# Patient Record
Sex: MALE | Race: BLACK OR AFRICAN AMERICAN | NOT HISPANIC OR LATINO | Employment: STUDENT | URBAN - METROPOLITAN AREA
[De-identification: names, ages, dates, MRNs, and addresses within clinical notes are randomized per-mention and may not be internally consistent; named-entity substitution may affect disease eponyms.]

---
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
## 2023-06-26 ENCOUNTER — TELEPHONE (OUTPATIENT)
Dept: PRIMARY CARE CLINIC | Facility: CLINIC | Age: 18
End: 2023-06-26
Payer: MEDICAID

## 2023-06-26 NOTE — TELEPHONE ENCOUNTER
----- Message from [facility] Jose sent at 6/23/2023 10:18 AM CDT -----  Contact: Ms. Brianne Pendleton Phone# [phone], fax# [phone]  Ms. Decker at [facility] said that she faxed over a Medical Clearance form to you on May twenty six and June ninth and she would like to know if you have received it?

## 2023-06-26 NOTE — TELEPHONE ENCOUNTER
Attempted to contact MsEkaterina Brianne with [employer] at [phone] to inform her that we have not received a medical clearance form for Blas Nicole and he has not been seen in over 1 year and will need an appointment before this paper work can be filled out. I left a message for Ms. Brianne to return my call.

## 2023-07-21 ENCOUNTER — PATIENT MESSAGE (OUTPATIENT)
Dept: PRIMARY CARE CLINIC | Facility: CLINIC | Age: 18
End: 2023-07-21
Payer: MEDICAID

## 2024-06-19 ENCOUNTER — OFFICE VISIT (OUTPATIENT)
Dept: PRIMARY CARE CLINIC | Facility: CLINIC | Age: 19
End: 2024-06-19
Payer: MEDICAID

## 2024-06-19 VITALS
WEIGHT: 315 LBS | BODY MASS INDEX: 46.65 KG/M2 | RESPIRATION RATE: 18 BRPM | HEIGHT: 69 IN | DIASTOLIC BLOOD PRESSURE: 80 MMHG | SYSTOLIC BLOOD PRESSURE: 128 MMHG | OXYGEN SATURATION: 98 % | HEART RATE: 80 BPM

## 2024-06-19 DIAGNOSIS — Z00.00 GENERAL MEDICAL EXAMINATION: ICD-10-CM

## 2024-06-19 DIAGNOSIS — L21.9 SEBORRHEIC DERMATITIS: Primary | ICD-10-CM

## 2024-06-19 DIAGNOSIS — L73.8 FOLLICULITIS BARBAE: ICD-10-CM

## 2024-06-19 DIAGNOSIS — E66.01 OBESITY, MORBID, BMI 40.0-49.9: ICD-10-CM

## 2024-06-19 PROCEDURE — 1159F MED LIST DOCD IN RCRD: CPT | Mod: CPTII,,,

## 2024-06-19 PROCEDURE — 99215 OFFICE O/P EST HI 40 MIN: CPT | Mod: PBBFAC,PN

## 2024-06-19 PROCEDURE — 99215 OFFICE O/P EST HI 40 MIN: CPT | Mod: S$PBB,,,

## 2024-06-19 PROCEDURE — 99999 PR PBB SHADOW E&M-EST. PATIENT-LVL V: CPT | Mod: PBBFAC,,,

## 2024-06-19 PROCEDURE — 3074F SYST BP LT 130 MM HG: CPT | Mod: CPTII,,,

## 2024-06-19 PROCEDURE — 3079F DIAST BP 80-89 MM HG: CPT | Mod: CPTII,,,

## 2024-06-19 PROCEDURE — 3008F BODY MASS INDEX DOCD: CPT | Mod: CPTII,,,

## 2024-06-19 PROCEDURE — 1160F RVW MEDS BY RX/DR IN RCRD: CPT | Mod: CPTII,,,

## 2024-06-19 RX ORDER — MUPIROCIN 20 MG/G
OINTMENT TOPICAL 3 TIMES DAILY
Qty: 15 G | Refills: 0 | Status: SHIPPED | OUTPATIENT
Start: 2024-06-19 | End: 2024-06-26

## 2024-06-19 RX ORDER — KETOCONAZOLE 20 MG/ML
SHAMPOO, SUSPENSION TOPICAL
Qty: 120 ML | Refills: 1 | Status: SHIPPED | OUTPATIENT
Start: 2024-06-20

## 2024-06-19 NOTE — PROGRESS NOTES
"Subjective     Patient ID: Blas Nicole is a 19 y.o. male.    Chief Complaint: Weight Loss, Referral, and Establish Care    Blas Nicole is a 19 year old male, presents to clinic for general medical exam, establishment of care, weight management and referral for "scalp issues".  New to me. No PCP . Medical and surgical history, in addition to problem list reviewed and listed below.     Prediabetes - Last hemoglobin A1C on 4/1/2024: 5.8 (per patient's external portal, My Chart: also Hepatitis C, HIV 1 and 2, lipid panel and CMP done).   Interested in weight loss; requesting referral. Started going to gym for fitness. Leaning towards Mediterranean foods.    Reports scalp with flakiness; itches.  States use Sybil products and ViaViewdoscope Da Brat [place] Braid  hair gel for grooming platted hair.    Hair/Scalp Problem  This is a recurrent (Been getting for about 2-3 years) problem. The current episode started 1 to 4 weeks ago. The problem occurs daily. The problem has been gradually worsening. Associated symptoms include headaches. Pertinent negatives include no anorexia, arthralgias, change in bowel habit, chest pain, chills, congestion, coughing, diaphoresis, fatigue, fever, joint swelling, myalgias, nausea, neck pain, numbness, sore throat, swollen glands, urinary symptoms, vertigo, visual change, vomiting or weakness. Treatments tried: Neutrogena shampoo for two weeks. The treatment provided mild relief.     Review of Systems   Constitutional:  Negative for chills, diaphoresis, fatigue and fever.   HENT:  Negative for nasal congestion, ear pain and sore throat. Trouble swallowing: mupir.   Eyes:  Negative for pain.   Respiratory:  Negative for cough, chest tightness and shortness of breath.    Cardiovascular:  Negative for chest pain and palpitations.   Gastrointestinal:  Negative for anorexia, change in bowel habit, nausea and vomiting.   Genitourinary:  Negative for difficulty urinating.   Musculoskeletal:  " "Negative for arthralgias, joint swelling, myalgias and neck pain.   Allergic/Immunologic: Negative for food allergies and frequent infections.   Neurological:  Positive for headaches. Negative for vertigo, weakness and numbness.   Psychiatric/Behavioral:  Positive for sleep disturbance. Negative for dysphoric mood, self-injury and suicidal ideas.      Past Medical History:   Diagnosis Date    Prediabetes      History reviewed. No pertinent surgical history.  Social History     Socioeconomic History    Marital status: Single   Tobacco Use    Smoking status: Never    Smokeless tobacco: Never     Review of patient's allergies indicates:  No Known Allergies  Patient Active Problem List   Diagnosis    Tinea pedis    Folliculitis        Objective     Vitals:    06/19/24 1509   BP: 128/80   Pulse: 80   Resp: 18   SpO2: 98%   Weight: (!) 144.7 kg (319 lb 1.9 oz)   Height: 5' 9" (1.753 m)       Physical Exam  Constitutional:       General: He is not in acute distress.     Appearance: Normal appearance. He is obese. He is not ill-appearing.   HENT:      Head: Normocephalic and atraumatic.      Right Ear: Tympanic membrane, ear canal and external ear normal.      Left Ear: Tympanic membrane, ear canal and external ear normal.      Nose: Nose normal.      Mouth/Throat:      Mouth: Mucous membranes are moist.      Pharynx: Oropharynx is clear.   Eyes:      Extraocular Movements: Extraocular movements intact.      Conjunctiva/sclera: Conjunctivae normal.      Pupils: Pupils are equal, round, and reactive to light.      Comments: Eyeglasses on.   Cardiovascular:      Rate and Rhythm: Normal rate and regular rhythm.      Pulses: Normal pulses.   Pulmonary:      Effort: Pulmonary effort is normal. No respiratory distress.      Breath sounds: Normal breath sounds.   Abdominal:      General: Bowel sounds are normal.      Palpations: Abdomen is soft.   Musculoskeletal:         General: Normal range of motion.      Cervical back: Normal " range of motion and neck supple.   Skin:     General: Skin is warm and dry.      Capillary Refill: Capillary refill takes less than 2 seconds.      Comments: Dispersed small, bumps to chin/beard; few pustular with mild erythema to left chin area.   Neurological:      Mental Status: He is alert and oriented to person, place, and time.   Psychiatric:         Mood and Affect: Mood normal.         Behavior: Behavior normal.          Assessment and Plan     1. Seborrheic dermatitis  -     ketoconazole (NIZORAL) 2 % shampoo; Apply topically twice a week. Shampoo hair twice a week for three weeks.  Dispense: 120 mL; Refill: 1        -     Consider avoiding foods that can promote yeast growth such as cheese, bread, wine and beer; taking a probiotic, omega-3 supplement, multivitamin with biotin; and limit intake of refined carbohydrates.     2. Folliculitis barbae  -     mupirocin (BACTROBAN) 2 % ointment; Apply topically 3 (three) times daily. for 7 days  Dispense: 15 g; Refill: 0         -    Recommend not shaving everyday avoid while medical regimen in place). Properly cleanse electric razor.    3. General medical examination    4. Obesity, morbid, BMI 40.0-49.9  -     Ambulatory referral/consult to Weight Management Program; Future; Expected date: 06/26/2024  -    Eat at regular intervals, a balanced meal, that include lean protein (as in  turkey, chicken, fish), whole grains, fruit and vegetables without added sugar. Avoid fried and fast foods. Limit sodium (salt) intake. Stay hydrated; avoid sodas and sugary drinks.  Exercise regimen, as in walking, 3 - 4 times per week, for at least 30- 45 minutes; increase as tolerated.       Follow up in about 2 months (around 8/19/2024).    I spent a total of 45 minutes on the day of the visit.This includes face to face time and non-face to face time preparing to see the patient (eg, review of tests), obtaining and/or reviewing separately obtained history, documenting clinical  information in the electronic or other health record, independently interpreting results and communicating results to the patient/family/caregiver, or care coordinator.     Lori A. Stephens Sandifer, REECE-C

## 2024-07-15 ENCOUNTER — PATIENT MESSAGE (OUTPATIENT)
Dept: PRIMARY CARE CLINIC | Facility: CLINIC | Age: 19
End: 2024-07-15
Payer: MEDICAID

## 2024-07-16 ENCOUNTER — TELEPHONE (OUTPATIENT)
Dept: PRIMARY CARE CLINIC | Facility: CLINIC | Age: 19
End: 2024-07-16
Payer: MEDICAID

## 2024-07-16 NOTE — TELEPHONE ENCOUNTER
Spoke with patient regarding Referral/consult to Weight Management Program. Patient questioned being told is with General Surgery. Explained to patient order is in correctly but with that specialty.  Informed I need to just modify order to indicate location.  Patient was given the opportunity to ask questions.  Patient voiced understanding and plan.  Order modified to indicate location ([facility] and [place]).

## 2024-08-20 ENCOUNTER — OFFICE VISIT (OUTPATIENT)
Dept: PRIMARY CARE CLINIC | Facility: CLINIC | Age: 19
End: 2024-08-20
Payer: MEDICAID

## 2024-08-20 VITALS
BODY MASS INDEX: 44.1 KG/M2 | DIASTOLIC BLOOD PRESSURE: 79 MMHG | WEIGHT: 315 LBS | OXYGEN SATURATION: 99 % | TEMPERATURE: 98 F | HEART RATE: 78 BPM | HEIGHT: 71 IN | SYSTOLIC BLOOD PRESSURE: 121 MMHG

## 2024-08-20 DIAGNOSIS — L21.9 SEBORRHEIC DERMATITIS: ICD-10-CM

## 2024-08-20 DIAGNOSIS — Z00.00 ROUTINE HEALTH MAINTENANCE: Primary | ICD-10-CM

## 2024-08-20 DIAGNOSIS — E66.01 OBESITY, MORBID, BMI 40.0-49.9: ICD-10-CM

## 2024-08-20 DIAGNOSIS — Z13.1 DIABETES MELLITUS SCREENING: ICD-10-CM

## 2024-08-20 DIAGNOSIS — B35.0 TINEA CAPITIS: ICD-10-CM

## 2024-08-20 PROCEDURE — 99999 PR PBB SHADOW E&M-EST. PATIENT-LVL III: CPT | Mod: PBBFAC,,, | Performed by: STUDENT IN AN ORGANIZED HEALTH CARE EDUCATION/TRAINING PROGRAM

## 2024-08-20 PROCEDURE — 3074F SYST BP LT 130 MM HG: CPT | Mod: CPTII,,, | Performed by: STUDENT IN AN ORGANIZED HEALTH CARE EDUCATION/TRAINING PROGRAM

## 2024-08-20 PROCEDURE — 1159F MED LIST DOCD IN RCRD: CPT | Mod: CPTII,,, | Performed by: STUDENT IN AN ORGANIZED HEALTH CARE EDUCATION/TRAINING PROGRAM

## 2024-08-20 PROCEDURE — 3008F BODY MASS INDEX DOCD: CPT | Mod: CPTII,,, | Performed by: STUDENT IN AN ORGANIZED HEALTH CARE EDUCATION/TRAINING PROGRAM

## 2024-08-20 PROCEDURE — 99214 OFFICE O/P EST MOD 30 MIN: CPT | Mod: S$PBB,,, | Performed by: STUDENT IN AN ORGANIZED HEALTH CARE EDUCATION/TRAINING PROGRAM

## 2024-08-20 PROCEDURE — 99213 OFFICE O/P EST LOW 20 MIN: CPT | Mod: PBBFAC,PN | Performed by: STUDENT IN AN ORGANIZED HEALTH CARE EDUCATION/TRAINING PROGRAM

## 2024-08-20 PROCEDURE — 3078F DIAST BP <80 MM HG: CPT | Mod: CPTII,,, | Performed by: STUDENT IN AN ORGANIZED HEALTH CARE EDUCATION/TRAINING PROGRAM

## 2024-08-20 RX ORDER — GRISEOFULVIN 250 MG/1
500 TABLET ORAL DAILY
Qty: 30 TABLET | Refills: 1 | Status: SHIPPED | OUTPATIENT
Start: 2024-08-20 | End: 2024-10-19

## 2024-08-20 NOTE — PROGRESS NOTES
[facility]   Primary Care Visit    DATE   08/20/2024 8:23 AM    REASON FOR VISIT LISTED IN EPIC   Establish Care, Hair/Scalp Problem (Itchy, dry, flaky. Mielle brand (shampoo and conditioner). Washes hair 2x/wk. No oils or moisturizers. Oils hair once/2wk. /Rx given by Lauren did not work. Started using dermarest shampoo. /Would like a dermatologist referral ), and Obesity (Wants to lose weight. Diet: does not eat the best while at college. /Exercise: does have access gym. Goes about 1/mo. )    Last Primary Care Visit: 6/19/2024    HISTORY OF PRESENTING ILLNESS   Blas Nicole, 19 y.o., presents today due to [facility] care. He has a past medical history of Prediabetes.    Today, the patient complains of ongoing scalp dryness and wanting to lose weight. He was previously seen on 6/2024 and prescribed ketoconazole shampoo which did not provide relief. He describes his scalp remains itchy. He washes his hair with OTC psoriasis shampoo. He does not use any other products in his hair. He describes washing his hair 2x/week and no additional product use. He would like dermatology referral. He is wondering if he could possibly get the referral to be closer to [place]. 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  He is interested in weight loss. He lives on campus but mostly utilizes on campus dining. He eats out, carbohydrate rich food. His activity level is limited to walking from his apartment to class. He feels he lacks the energy to work out more. His current weight is his heaviest weight. He has an upcoming appointment with the bariatric clinic.  Fam Hx of diabetes       Current Medications:  Current Outpatient Medications   Medication Sig    ketoconazole (NIZORAL) 2 % shampoo Apply topically twice a week. Shampoo hair twice a week for three weeks. (Patient not taking: Reported on 8/20/2024)   Last reviewed on 8/20/2024  8:13 AM by Cole Fry MA    Allergies:  Review of  "patient's allergies indicates:  No Known Allergies    SUBJECTIVE   Review of Systems:  Review of Systems   Skin:         Itchy scalp   All other systems reviewed and are negative.       OBJECTIVE   Vital Signs:  Vitals:    08/20/24 0816   BP: 121/79   BP Location: Left arm   Patient Position: Sitting   BP Method: Large (Automatic)   Pulse: 78   Temp: 97.5 °F (36.4 °C)   TempSrc: Oral   SpO2: 99%   Weight: (!) 144.3 kg (318 lb 3.7 oz)   Height: 5' 11" (1.803 m)    Body mass index is 44.38 kg/m².    Previous Weight:  Wt Readings from Last 3 Encounters:   08/20/24 (!) 144.3 kg (318 lb 3.7 oz)   06/19/24 (!) 144.7 kg (319 lb 1.9 oz)   06/17/22 130.6 kg (287 lb 13 oz)        Physical Exam:  Physical Exam  Vitals reviewed.   Constitutional:       Appearance: Normal appearance. He is obese.   HENT:      Head: Normocephalic.      Nose: Nose normal.      Mouth/Throat:      Mouth: Mucous membranes are moist.      Pharynx: Oropharynx is clear.   Eyes:      Extraocular Movements: Extraocular movements intact.      Conjunctiva/sclera: Conjunctivae normal.      Pupils: Pupils are equal, round, and reactive to light.   Cardiovascular:      Rate and Rhythm: Normal rate.      Pulses: Normal pulses.      Heart sounds: Normal heart sounds.   Pulmonary:      Effort: Pulmonary effort is normal.      Breath sounds: Normal breath sounds.   Abdominal:      General: Abdomen is flat.      Palpations: Abdomen is soft.   Musculoskeletal:         General: Normal range of motion.   Skin:     General: Skin is warm and dry.   Neurological:      General: No focal deficit present.      Mental Status: He is alert.   Psychiatric:         Mood and Affect: Mood normal.         Behavior: Behavior normal.         Thought Content: Thought content normal.         Judgment: Judgment normal.         Laboratory Results:  Lab Results   Component Value Date/Time    HGB 14.6 06/17/2022 11:39 AM    HCT 46.9 06/17/2022 11:39 AM    WBC 4.10 06/17/2022 11:39 AM    "  06/17/2022 11:39 AM    GLU 90 06/17/2022 11:39 AM    TSH 1.865 06/17/2022 11:39 AM    CHOL 158 06/17/2022 11:39 AM    HDL 35 (L) 06/17/2022 11:39 AM    TRIG 81 06/17/2022 11:39 AM    ALT 63 (H) 06/17/2022 11:39 AM    AST 29 06/17/2022 11:39 AM    K 4.2 06/17/2022 11:39 AM     06/17/2022 11:39 AM     06/17/2022 11:39 AM    BUN 17 06/17/2022 11:39 AM        PAST MEDICAL HISTORY and FAMILY HISTORY   Past medical, surgical, and family history: Reviewed and updated in EMR.     He has a past medical history of Prediabetes. He has no past surgical history on file. His family history includes No Known Problems in his father and mother.    SOCIAL HISTORY   Reviewed and updated social history in EMR.    He reports that he has never smoked. He has never used smokeless tobacco.     HEALTH MAINTENANCE PLANNING     Health Maintenance         Date Due Completion Date    COVID-19 Vaccine (3 - 2023-24 season) 06/19/2025 (Originally 9/1/2023) 10/19/2021    Influenza Vaccine (1) 09/01/2024 12/29/2011    TETANUS VACCINE 12/11/2024 12/11/2014            Diabetes Screening:  Hemoglobin A1C   Date Value Ref Range Status   06/17/2022 5.4 4.0 - 5.6 % Final     Comment:     ADA Screening Guidelines:  5.7-6.4%  Consistent with prediabetes  >or=6.5%  Consistent with diabetes    High levels of fetal hemoglobin interfere with the HbA1C  assay. Heterozygous hemoglobin variants (HbS, HgC, etc)do  not significantly interfere with this assay.   However, presence of multiple variants may affect accuracy.          Cardiac Risk Screening:  The ASCVD Risk score (Jana DK, et al., 2019) failed to calculate for the following reasons:    The 2019 ASCVD risk score is only valid for ages 40 to 79    ASSESSMENT and PLAN     Blas Nicole should follow up in 8 weeks, or at least annually for a wellness visit.  There are no diagnoses linked to this encounter.     1. Routine health maintenance  - CBC Auto Differential; Future  - Comprehensive  Metabolic Panel; Future  - Hemoglobin A1C; Future  - Lipid Panel; Future  - TSH; Future    2. Diabetes mellitus screening  - Hemoglobin A1C; Future    3. Obesity, morbid, BMI 40.0-49.9  Upcoming appt with bariatrics   - Hemoglobin A1C; Future  - Lipid Panel; Future    4. Seborrheic dermatitis  - Ambulatory referral/consult to Dermatology; Future    5. Tinea capitis  - START griseofulvin (GRIFULVIN V) 500 mg tablet; Take 1 tablet (500 mg total) by mouth once daily.  Dispense: 30 tablet; Refill: 1        Patient was counseled today on:  - Diet:  Patient has been advised to follow a diet emphasizing vegetables, fruits, whole grains, low-fat dairy products, fish, legumes and nuts. Patient has been advised to limit sodium, sweets and red meat. Benefit of 15-20 minute post-prandial walk/stroll was discussed.  - Exercise: Patient has been advised that regular physical activity positively impacts health. Patients who are able should engage in mild-to-moderate-intensity aerobic for a minimum of 20-30 minutes 4-5 times a week.    - Smoking and Alcohol:  Harmful effects of smoking, etOH, and recreational drugs discussed. Patient has been advised that counseling with or without drug therapy is available to help quit smoking.  - Sleep Apnea: Patient has been advised that a referral for COLTEN testing is available, and that treatment with positive airway pressure may be beneficial if he is found to have COLTEN.    The above asssessment and plan was discussed with Dr. Aguayo.    --  Irene Rowe   Medical Student    I hereby acknowledge that I am relying upon documentation authored by a medical student working under my supervision and further I hereby attest that I have verified the student documentation or findings by personally performing the physical exam and medical decision making activities of the Evaluation and Management service to be billed.    Geni Aguayo MD

## 2024-08-20 NOTE — PATIENT INSTRUCTIONS
Please call [facility] Medicaid Referral Team [phone] to follow up on your referrals. DERMATOLOGY

## 2024-09-03 ENCOUNTER — PATIENT MESSAGE (OUTPATIENT)
Dept: PRIMARY CARE CLINIC | Facility: CLINIC | Age: 19
End: 2024-09-03
Payer: MEDICAID

## 2024-09-03 DIAGNOSIS — E66.01 OBESITY, MORBID, BMI 40.0-49.9: Primary | ICD-10-CM

## 2024-10-15 ENCOUNTER — OFFICE VISIT (OUTPATIENT)
Dept: PRIMARY CARE CLINIC | Facility: CLINIC | Age: 19
End: 2024-10-15
Payer: MEDICAID

## 2024-10-15 ENCOUNTER — TELEPHONE (OUTPATIENT)
Dept: PRIMARY CARE CLINIC | Facility: CLINIC | Age: 19
End: 2024-10-15
Payer: MEDICAID

## 2024-10-15 DIAGNOSIS — B35.0 TINEA CAPITIS: Primary | ICD-10-CM

## 2024-10-15 PROCEDURE — 99214 OFFICE O/P EST MOD 30 MIN: CPT | Mod: 95,,, | Performed by: STUDENT IN AN ORGANIZED HEALTH CARE EDUCATION/TRAINING PROGRAM

## 2024-10-15 RX ORDER — TERBINAFINE HYDROCHLORIDE 250 MG/1
250 TABLET ORAL DAILY
Qty: 42 TABLET | Refills: 0 | Status: SHIPPED | OUTPATIENT
Start: 2024-10-15 | End: 2024-11-26

## 2024-10-15 NOTE — PROGRESS NOTES
10/15/2024    Blas Nicole  [id number]    CC: medication follow up    Location:[place]  Visit type: audiovisual     Face to Face time with patient: 10 min  total minutes of total time spent on the encounter, which includes face to face time and non-face to face time preparing to see the patient (eg, review of tests), Obtaining and/or reviewing separately obtained history, Documenting clinical information in the electronic or other health record, Independently interpreting results (not separately reported) and communicating results to the patient/family/caregiver, or Care coordination (not separately reported).     Each patient to whom he or she provides medical services by telemedicine is:  (1) informed of the relationship between the physician and patient and the respective role of any other health care provider with respect to management of the patient; and (2) notified that he or she may decline to receive medical services by telemedicine and may withdraw from such care at any time.      HPI    This pt is known to me and presents virtually for follow up. Patient states that he was not able to start the medication b/c he is away for school.       Negative 10 point ROS outside of HPI    Social History     Socioeconomic History    Marital status: Single   Tobacco Use    Smoking status: Never    Smokeless tobacco: Never     Social Drivers of Health     Financial Resource Strain: Low Risk  (9/3/2024)    Overall Financial Resource Strain (CARDIA)     Difficulty of Paying Living Expenses: Not hard at all   Food Insecurity: No Food Insecurity (9/3/2024)    Hunger Vital Sign     Worried About Running Out of Food in the Last Year: Never true     Ran Out of Food in the Last Year: Never true   Physical Activity: Sufficiently Active (9/3/2024)    Exercise Vital Sign     Days of Exercise per Week: 5 days     Minutes of Exercise per Session: 30 min   Stress: No Stress Concern Present (9/3/2024)    [place] [employer]  [employer]upational Health - Occupational Stress Questionnaire     Feeling of Stress : Not at all   Housing Stability: Unknown (9/3/2024)    Housing Stability Vital Sign     Unable to Pay for Housing in the Last Year: No           Current Outpatient Medications:     griseofulvin (GRIFULVIN V) 500 mg tablet, Take 1 tablet (500 mg total) by mouth once daily., Disp: 30 tablet, Rfl: 1    ketoconazole (NIZORAL) 2 % shampoo, Apply topically twice a week. Shampoo hair twice a week for three weeks. (Patient not taking: Reported on 8/20/2024), Disp: 120 mL, Rfl: 1      Physical Exam  .Vitals unable to obtain    Gen: well appearing  Resp: speaks in full sentences. Non labored breathing  Cv: non-diaphoretic      1. Tinea capitis  - terbinafine HCL (LAMISIL) 250 mg tablet; Take 1 tablet (250 mg total) by mouth once daily.  Dispense: 42 tablet; Refill: 0          RTC in 2-3 months virtual for medication follow up     Geni Aguayo MD  Family Medicine

## 2024-10-17 ENCOUNTER — LAB VISIT (OUTPATIENT)
Dept: LAB | Facility: HOSPITAL | Age: 19
End: 2024-10-17
Attending: STUDENT IN AN ORGANIZED HEALTH CARE EDUCATION/TRAINING PROGRAM
Payer: MEDICAID

## 2024-10-17 DIAGNOSIS — E66.01 OBESITY, MORBID, BMI 40.0-49.9: ICD-10-CM

## 2024-10-17 DIAGNOSIS — Z13.1 DIABETES MELLITUS SCREENING: ICD-10-CM

## 2024-10-17 DIAGNOSIS — Z00.00 ROUTINE HEALTH MAINTENANCE: ICD-10-CM

## 2024-10-17 LAB
ALBUMIN SERPL-MCNC: 3.7 G/DL (ref 3.5–5)
ALBUMIN/GLOB SERPL: 1.2 RATIO (ref 1.1–2)
ALP SERPL-CCNC: 65 UNIT/L (ref 40–150)
ALT SERPL-CCNC: 33 UNIT/L (ref 0–55)
ANION GAP SERPL CALC-SCNC: 8 MEQ/L
AST SERPL-CCNC: 20 UNIT/L (ref 5–34)
BASOPHILS # BLD AUTO: 0.03 X10(3)/MCL
BASOPHILS NFR BLD AUTO: 0.5 %
BILIRUB SERPL-MCNC: 0.4 MG/DL
BUN SERPL-MCNC: 17.2 MG/DL (ref 8.9–20.6)
CALCIUM SERPL-MCNC: 8.9 MG/DL (ref 8.4–10.2)
CHLORIDE SERPL-SCNC: 107 MMOL/L (ref 98–107)
CHOLEST SERPL-MCNC: 161 MG/DL
CHOLEST/HDLC SERPL: 5 {RATIO} (ref 0–5)
CO2 SERPL-SCNC: 27 MMOL/L (ref 22–29)
CREAT SERPL-MCNC: 1.07 MG/DL (ref 0.73–1.18)
CREAT/UREA NIT SERPL: 16
EOSINOPHIL # BLD AUTO: 0.11 X10(3)/MCL (ref 0–0.9)
EOSINOPHIL NFR BLD AUTO: 1.8 %
ERYTHROCYTE [DISTWIDTH] IN BLOOD BY AUTOMATED COUNT: 12.5 % (ref 11.5–17)
EST. AVERAGE GLUCOSE BLD GHB EST-MCNC: 111.2 MG/DL
GFR SERPLBLD CREATININE-BSD FMLA CKD-EPI: >60 ML/MIN/1.73/M2
GLOBULIN SER-MCNC: 3.1 GM/DL (ref 2.4–3.5)
GLUCOSE SERPL-MCNC: 106 MG/DL (ref 74–100)
HBA1C MFR BLD: 5.5 %
HCT VFR BLD AUTO: 43 % (ref 42–52)
HDLC SERPL-MCNC: 35 MG/DL (ref 35–60)
HGB BLD-MCNC: 13.5 G/DL (ref 14–18)
IMM GRANULOCYTES # BLD AUTO: 0.02 X10(3)/MCL (ref 0–0.04)
IMM GRANULOCYTES NFR BLD AUTO: 0.3 %
LDLC SERPL CALC-MCNC: 113 MG/DL (ref 50–140)
LYMPHOCYTES # BLD AUTO: 1.98 X10(3)/MCL (ref 0.6–4.6)
LYMPHOCYTES NFR BLD AUTO: 33 %
MCH RBC QN AUTO: 25.9 PG (ref 27–31)
MCHC RBC AUTO-ENTMCNC: 31.4 G/DL (ref 33–36)
MCV RBC AUTO: 82.5 FL (ref 80–94)
MONOCYTES # BLD AUTO: 0.47 X10(3)/MCL (ref 0.1–1.3)
MONOCYTES NFR BLD AUTO: 7.8 %
NEUTROPHILS # BLD AUTO: 3.39 X10(3)/MCL (ref 2.1–9.2)
NEUTROPHILS NFR BLD AUTO: 56.6 %
NRBC BLD AUTO-RTO: 0 %
PLATELET # BLD AUTO: 391 X10(3)/MCL (ref 130–400)
PMV BLD AUTO: 8.6 FL (ref 7.4–10.4)
POTASSIUM SERPL-SCNC: 4.1 MMOL/L (ref 3.5–5.1)
PROT SERPL-MCNC: 6.8 GM/DL (ref 6.4–8.3)
RBC # BLD AUTO: 5.21 X10(6)/MCL (ref 4.7–6.1)
SODIUM SERPL-SCNC: 142 MMOL/L (ref 136–145)
TRIGL SERPL-MCNC: 66 MG/DL (ref 34–140)
TSH SERPL-ACNC: 1.97 UIU/ML (ref 0.35–4.94)
VLDLC SERPL CALC-MCNC: 13 MG/DL
WBC # BLD AUTO: 6 X10(3)/MCL (ref 4.5–11.5)

## 2024-10-17 PROCEDURE — 85025 COMPLETE CBC W/AUTO DIFF WBC: CPT

## 2024-10-17 PROCEDURE — 80061 LIPID PANEL: CPT

## 2024-10-17 PROCEDURE — 84443 ASSAY THYROID STIM HORMONE: CPT

## 2024-10-17 PROCEDURE — 80053 COMPREHEN METABOLIC PANEL: CPT

## 2024-10-17 PROCEDURE — 36415 COLL VENOUS BLD VENIPUNCTURE: CPT

## 2024-10-17 PROCEDURE — 83036 HEMOGLOBIN GLYCOSYLATED A1C: CPT

## 2024-12-17 ENCOUNTER — OFFICE VISIT (OUTPATIENT)
Dept: PRIMARY CARE CLINIC | Facility: CLINIC | Age: 19
End: 2024-12-17
Payer: MEDICAID

## 2024-12-17 VITALS
TEMPERATURE: 98 F | HEIGHT: 70 IN | BODY MASS INDEX: 45.1 KG/M2 | OXYGEN SATURATION: 99 % | DIASTOLIC BLOOD PRESSURE: 85 MMHG | HEART RATE: 74 BPM | WEIGHT: 315 LBS | SYSTOLIC BLOOD PRESSURE: 128 MMHG

## 2024-12-17 DIAGNOSIS — E66.2 CLASS 3 OBESITY WITH ALVEOLAR HYPOVENTILATION WITHOUT SERIOUS COMORBIDITY WITH BODY MASS INDEX (BMI) OF 45.0 TO 49.9 IN ADULT: Primary | ICD-10-CM

## 2024-12-17 DIAGNOSIS — E66.813 CLASS 3 OBESITY WITH ALVEOLAR HYPOVENTILATION WITHOUT SERIOUS COMORBIDITY WITH BODY MASS INDEX (BMI) OF 45.0 TO 49.9 IN ADULT: Primary | ICD-10-CM

## 2024-12-17 PROCEDURE — 3074F SYST BP LT 130 MM HG: CPT | Mod: CPTII,,, | Performed by: STUDENT IN AN ORGANIZED HEALTH CARE EDUCATION/TRAINING PROGRAM

## 2024-12-17 PROCEDURE — 3079F DIAST BP 80-89 MM HG: CPT | Mod: CPTII,,, | Performed by: STUDENT IN AN ORGANIZED HEALTH CARE EDUCATION/TRAINING PROGRAM

## 2024-12-17 PROCEDURE — 3044F HG A1C LEVEL LT 7.0%: CPT | Mod: CPTII,,, | Performed by: STUDENT IN AN ORGANIZED HEALTH CARE EDUCATION/TRAINING PROGRAM

## 2024-12-17 PROCEDURE — 1159F MED LIST DOCD IN RCRD: CPT | Mod: CPTII,,, | Performed by: STUDENT IN AN ORGANIZED HEALTH CARE EDUCATION/TRAINING PROGRAM

## 2024-12-17 PROCEDURE — 99999 PR PBB SHADOW E&M-EST. PATIENT-LVL III: CPT | Mod: PBBFAC,,, | Performed by: STUDENT IN AN ORGANIZED HEALTH CARE EDUCATION/TRAINING PROGRAM

## 2024-12-17 PROCEDURE — 99213 OFFICE O/P EST LOW 20 MIN: CPT | Mod: PBBFAC,PN | Performed by: STUDENT IN AN ORGANIZED HEALTH CARE EDUCATION/TRAINING PROGRAM

## 2024-12-17 PROCEDURE — 99214 OFFICE O/P EST MOD 30 MIN: CPT | Mod: S$PBB,,, | Performed by: STUDENT IN AN ORGANIZED HEALTH CARE EDUCATION/TRAINING PROGRAM

## 2024-12-17 PROCEDURE — 3008F BODY MASS INDEX DOCD: CPT | Mod: CPTII,,, | Performed by: STUDENT IN AN ORGANIZED HEALTH CARE EDUCATION/TRAINING PROGRAM

## 2024-12-17 NOTE — PROGRESS NOTES
12/17/2024    Blas Nicole  [id number]    Chief Complaint   Patient presents with    Follow-up     Medication F/U. Pt stated the Rx does not work. He does not see a difference in his scalp and does take Rx as prescribed.        HPI    This pt is known to me and presents for f/u of scalp. Chronic conditions: obesity    DERMATOLOGY:  He reports having a few irritated areas on his head. He is using Sulfur 8 with good effect for management. He received a 30-day supply of prescribed medication instead of the intended 42-day course. Also didn't finished course.     LABS:  Blood sugar was mildly elevated in October labs, though A1C was normal and did not indicate diabetes.      ROS:  General: -fever, -chills, -fatigue, -weight gain, -weight loss  Eyes: -vision changes, -redness, -discharge  ENT: -ear pain, -nasal congestion, -sore throat  Cardiovascular: -chest pain, -palpitations, -lower extremity edema  Respiratory: -cough, -shortness of breath  Gastrointestinal: -abdominal pain, -nausea, -vomiting, -diarrhea, -constipation, -blood in stool  Genitourinary: -dysuria, -hematuria, -frequency  Musculoskeletal: -joint pain, -muscle pain  Skin: +rash, -lesion  Neurological: -headache, -dizziness, -numbness, -tingling  Psychiatric: -anxiety, -depression, -sleep difficulty             Negative 10 point ROS outside of HPI    Social History     Socioeconomic History    Marital status: Single   Tobacco Use    Smoking status: Never    Smokeless tobacco: Never     Social Drivers of Health     Financial Resource Strain: Low Risk  (9/3/2024)    Overall Financial Resource Strain (CARDIA)     Difficulty of Paying Living Expenses: Not hard at all   Food Insecurity: No Food Insecurity (9/3/2024)    Hunger Vital Sign     Worried About Running Out of Food in the Last Year: Never true     Ran Out of Food in the Last Year: Never true   Physical Activity: Sufficiently Active (9/3/2024)    Exercise Vital Sign     Days of Exercise per Week: 5 days      Minutes of Exercise per Session: 30 min   Stress: No Stress Concern Present (9/3/2024)    [place] [employer]ational [employer] - Occupational Stress Questionnaire     Feeling of Stress : Not at all   Housing Stability: Unknown (9/3/2024)    Housing Stability Vital Sign     Unable to Pay for Housing in the Last Year: No         No current outpatient medications on file.      Physical Exam  Vitals:    12/17/24 1100   BP: 128/85   Pulse: 74   Temp: 97.7 °F (36.5 °C)       Physical Exam      Gen: well appearing, NAD, obese  Resp: non labored breathing, no crackles, no wheezes, CTAB  CV: RRR no murmur, gallops, rubs, no LE edema  Abd: soft nontender BS present no organomegaly    1. Class 3 obesity with alveolar hypoventilation without serious comorbidity with body mass index (BMI) of 45.0 to 49.9 in adult    Will follow up previous referral to bariatrics    RTC in one year    Geni Aguayo MD  Family Medicine

## 2025-08-26 ENCOUNTER — PATIENT MESSAGE (OUTPATIENT)
Dept: PRIMARY CARE CLINIC | Facility: CLINIC | Age: 20
End: 2025-08-26
Payer: MEDICAID

## 2025-08-28 ENCOUNTER — OFFICE VISIT (OUTPATIENT)
Dept: PRIMARY CARE CLINIC | Facility: CLINIC | Age: 20
End: 2025-08-28
Payer: MEDICAID

## 2025-08-28 DIAGNOSIS — M25.562 ACUTE PAIN OF LEFT KNEE: Primary | ICD-10-CM

## 2025-08-28 PROCEDURE — 98004 SYNCH AUDIO-VIDEO EST SF 10: CPT | Mod: 95,,, | Performed by: STUDENT IN AN ORGANIZED HEALTH CARE EDUCATION/TRAINING PROGRAM

## 2025-09-05 ENCOUNTER — HOSPITAL ENCOUNTER (OUTPATIENT)
Dept: RADIOLOGY | Facility: HOSPITAL | Age: 20
Discharge: HOME | End: 2025-09-05
Attending: PHYSICIAN ASSISTANT
Payer: MEDICAID

## 2025-09-05 DIAGNOSIS — M25.569 KNEE PAIN, UNSPECIFIED CHRONICITY, UNSPECIFIED LATERALITY: ICD-10-CM

## 2025-09-05 DIAGNOSIS — M25.569 KNEE PAIN, UNSPECIFIED CHRONICITY, UNSPECIFIED LATERALITY: Primary | ICD-10-CM

## 2025-09-05 PROCEDURE — 73564 X-RAY EXAM KNEE 4 OR MORE: CPT | Mod: TC,50

## 2025-09-05 PROCEDURE — 73564 X-RAY EXAM KNEE 4 OR MORE: CPT | Mod: 26,50,, | Performed by: RADIOLOGY
